# Patient Record
Sex: FEMALE | Race: WHITE
[De-identification: names, ages, dates, MRNs, and addresses within clinical notes are randomized per-mention and may not be internally consistent; named-entity substitution may affect disease eponyms.]

---

## 2020-06-17 ENCOUNTER — HOSPITAL ENCOUNTER (EMERGENCY)
Dept: HOSPITAL 12 - ER | Age: 70
Discharge: HOME | End: 2020-06-17
Payer: MEDICARE

## 2020-06-17 VITALS — WEIGHT: 160 LBS | HEIGHT: 64 IN | BODY MASS INDEX: 27.31 KG/M2

## 2020-06-17 DIAGNOSIS — R53.83: Primary | ICD-10-CM

## 2020-06-17 DIAGNOSIS — R53.1: ICD-10-CM

## 2020-06-17 DIAGNOSIS — Z79.84: ICD-10-CM

## 2020-06-17 DIAGNOSIS — E11.65: ICD-10-CM

## 2020-06-17 LAB
ALP SERPL-CCNC: 65 U/L (ref 50–136)
ALT SERPL W/O P-5'-P-CCNC: 23 U/L (ref 14–59)
APPEARANCE UR: CLEAR
AST SERPL-CCNC: 23 U/L (ref 15–37)
BASOPHILS # BLD AUTO: 0 K/UL (ref 0–8)
BASOPHILS NFR BLD AUTO: 0.5 % (ref 0–2)
BILIRUB DIRECT SERPL-MCNC: 0.1 MG/DL (ref 0–0.2)
BILIRUB SERPL-MCNC: 0.5 MG/DL (ref 0.2–1)
BILIRUB UR QL STRIP: NEGATIVE
BUN SERPL-MCNC: 15 MG/DL (ref 7–18)
CHLORIDE SERPL-SCNC: 104 MMOL/L (ref 98–107)
CO2 SERPL-SCNC: 29 MMOL/L (ref 21–32)
COLOR UR: YELLOW
CREAT SERPL-MCNC: 0.9 MG/DL (ref 0.6–1.3)
DEPRECATED SQUAMOUS URNS QL MICRO: (no result) /HPF
EOSINOPHIL # BLD AUTO: 0.1 K/UL (ref 0–0.7)
EOSINOPHIL NFR BLD AUTO: 1.9 % (ref 0–7)
GLUCOSE SERPL-MCNC: 144 MG/DL (ref 74–106)
GLUCOSE UR STRIP-MCNC: NEGATIVE MG/DL
HCT VFR BLD AUTO: 36.8 % (ref 31.2–41.9)
HGB BLD-MCNC: 12.3 G/DL (ref 10.9–14.3)
HGB UR QL STRIP: (no result)
KETONES UR STRIP-MCNC: NEGATIVE MG/DL
LEUKOCYTE ESTERASE UR QL STRIP: NEGATIVE
LYMPHOCYTES # BLD AUTO: 2.2 K/UL (ref 20–40)
LYMPHOCYTES NFR BLD AUTO: 35.8 % (ref 20.5–51.5)
MCH RBC QN AUTO: 28.1 UUG (ref 24.7–32.8)
MCHC RBC AUTO-ENTMCNC: 34 G/DL (ref 32.3–35.6)
MCV RBC AUTO: 83.8 FL (ref 75.5–95.3)
MONOCYTES # BLD AUTO: 0.5 K/UL (ref 2–10)
MONOCYTES NFR BLD AUTO: 8.3 % (ref 0–11)
MUCOUS THREADS URNS QL MICRO: (no result) /LPF
NEUTROPHILS # BLD AUTO: 3.4 K/UL (ref 1.8–8.9)
NEUTROPHILS NFR BLD AUTO: 53.5 % (ref 38.5–71.5)
NITRITE UR QL STRIP: NEGATIVE
PH UR STRIP: 5.5 [PH] (ref 5–8)
PLATELET # BLD AUTO: 257 K/UL (ref 179–408)
POTASSIUM SERPL-SCNC: 4.2 MMOL/L (ref 3.5–5.1)
RBC # BLD AUTO: 4.39 MIL/UL (ref 3.63–4.92)
RBC #/AREA URNS HPF: (no result) /HPF (ref 0–3)
SP GR UR STRIP: 1.01 (ref 1–1.03)
TSH SERPL DL<=0.005 MIU/L-ACNC: 2.34 MIU/ML (ref 0.36–3.74)
UROBILINOGEN UR STRIP-MCNC: 0.2 E.U./DL
WBC # BLD AUTO: 6.3 K/UL (ref 3.8–11.8)
WBC #/AREA URNS HPF: (no result) /HPF
WBC #/AREA URNS HPF: (no result) /HPF (ref 0–3)
WS STN SPEC: 7.3 G/DL (ref 6.4–8.2)

## 2020-06-17 PROCEDURE — A4663 DIALYSIS BLOOD PRESSURE CUFF: HCPCS

## 2020-06-17 NOTE — NUR
IV removed.  Catheter intact and site benign. Pressure and 4x4 gauze applied to 
site. No bleeding noted.  Patient discharged to home in stable condition with 
steady gait.  Written and verbal after care instructions given to patient and 
patient's . Patient and spouse verbalized understanding of instructions. 
Stressed follow up with PMD or return to ER for worsening s/s.

## 2021-06-12 ENCOUNTER — HOSPITAL ENCOUNTER (EMERGENCY)
Dept: HOSPITAL 12 - ER | Age: 71
Discharge: HOME | End: 2021-06-12
Payer: MEDICARE

## 2021-06-12 VITALS — SYSTOLIC BLOOD PRESSURE: 110 MMHG | DIASTOLIC BLOOD PRESSURE: 64 MMHG

## 2021-06-12 VITALS — BODY MASS INDEX: 27.31 KG/M2 | HEIGHT: 64 IN | WEIGHT: 160 LBS

## 2021-06-12 DIAGNOSIS — K76.0: ICD-10-CM

## 2021-06-12 DIAGNOSIS — J84.9: ICD-10-CM

## 2021-06-12 DIAGNOSIS — Z79.899: ICD-10-CM

## 2021-06-12 DIAGNOSIS — N12: Primary | ICD-10-CM

## 2021-06-12 DIAGNOSIS — Z79.84: ICD-10-CM

## 2021-06-12 DIAGNOSIS — E11.9: ICD-10-CM

## 2021-06-12 LAB
ALP SERPL-CCNC: 61 U/L (ref 50–136)
ALT SERPL W/O P-5'-P-CCNC: 17 U/L (ref 14–59)
APPEARANCE UR: (no result)
AST SERPL-CCNC: 15 U/L (ref 15–37)
BASOPHILS # BLD AUTO: 0 K/UL (ref 0–8)
BASOPHILS NFR BLD AUTO: 0.5 % (ref 0–2)
BILIRUB DIRECT SERPL-MCNC: 0.1 MG/DL (ref 0–0.2)
BILIRUB SERPL-MCNC: 0.5 MG/DL (ref 0.2–1)
BILIRUB UR QL STRIP: NEGATIVE
BUN SERPL-MCNC: 17 MG/DL (ref 7–18)
CHLORIDE SERPL-SCNC: 100 MMOL/L (ref 98–107)
CO2 SERPL-SCNC: 26 MMOL/L (ref 21–32)
COLOR UR: YELLOW
CREAT SERPL-MCNC: 0.8 MG/DL (ref 0.6–1.3)
DEPRECATED SQUAMOUS URNS QL MICRO: (no result) /HPF
EOSINOPHIL # BLD AUTO: 0.1 K/UL (ref 0–0.7)
EOSINOPHIL NFR BLD AUTO: 1.1 % (ref 0–7)
GLUCOSE SERPL-MCNC: 99 MG/DL (ref 74–106)
GLUCOSE UR STRIP-MCNC: NEGATIVE MG/DL
HCT VFR BLD AUTO: 37.3 % (ref 31.2–41.9)
HGB BLD-MCNC: 12.3 G/DL (ref 10.9–14.3)
HGB UR QL STRIP: (no result)
KETONES UR STRIP-MCNC: NEGATIVE MG/DL
LEUKOCYTE ESTERASE UR QL STRIP: (no result)
LIPASE SERPL-CCNC: 50 U/L (ref 73–393)
LYMPHOCYTES # BLD AUTO: 2.5 K/UL (ref 20–40)
LYMPHOCYTES NFR BLD AUTO: 28.3 % (ref 20.5–51.5)
MCH RBC QN AUTO: 27.5 UUG (ref 24.7–32.8)
MCHC RBC AUTO-ENTMCNC: 33 G/DL (ref 32.3–35.6)
MCV RBC AUTO: 83.7 FL (ref 75.5–95.3)
MONOCYTES # BLD AUTO: 0.5 K/UL (ref 2–10)
MONOCYTES NFR BLD AUTO: 5.4 % (ref 0–11)
MUCOUS THREADS URNS QL MICRO: (no result) /LPF
NEUTROPHILS # BLD AUTO: 5.6 K/UL (ref 1.8–8.9)
NEUTROPHILS NFR BLD AUTO: 64.7 % (ref 38.5–71.5)
NITRITE UR QL STRIP: POSITIVE
PH UR STRIP: 7 [PH] (ref 5–8)
PLATELET # BLD AUTO: 288 K/UL (ref 179–408)
POTASSIUM SERPL-SCNC: 3.9 MMOL/L (ref 3.5–5.1)
RBC # BLD AUTO: 4.46 MIL/UL (ref 3.63–4.92)
RBC #/AREA URNS HPF: (no result) /HPF (ref 0–3)
SP GR UR STRIP: 1.02 (ref 1–1.03)
UROBILINOGEN UR STRIP-MCNC: 0.2 E.U./DL
WBC # BLD AUTO: 8.7 K/UL (ref 3.8–11.8)
WBC #/AREA URNS HPF: (no result) /HPF
WS STN SPEC: 7.4 G/DL (ref 6.4–8.2)

## 2021-06-12 PROCEDURE — A4663 DIALYSIS BLOOD PRESSURE CUFF: HCPCS

## 2021-07-07 ENCOUNTER — HOSPITAL ENCOUNTER (EMERGENCY)
Dept: HOSPITAL 12 - ER | Age: 71
LOS: 1 days | Discharge: HOME | End: 2021-07-08
Payer: MEDICARE

## 2021-07-07 VITALS — WEIGHT: 150 LBS | HEIGHT: 66 IN | BODY MASS INDEX: 24.11 KG/M2

## 2021-07-07 DIAGNOSIS — Z79.84: ICD-10-CM
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
## 2021-07-07 DIAGNOSIS — N39.0: ICD-10-CM

## 2021-07-07 DIAGNOSIS — S09.90XA: Primary | ICD-10-CM
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
## 2021-07-07 DIAGNOSIS — E83.42: ICD-10-CM

## 2021-07-07 DIAGNOSIS — E11.9: ICD-10-CM

## 2021-07-07 DIAGNOSIS — Z79.82: ICD-10-CM

## 2021-07-07 DIAGNOSIS — I10: ICD-10-CM

## 2021-07-07 DIAGNOSIS — I49.3: ICD-10-CM

## 2021-07-07 LAB
HCT VFR BLD AUTO: 35.2 % (ref 31.2–41.9)
MCH RBC QN AUTO: 27.7 UUG (ref 24.7–32.8)
MCV RBC AUTO: 83.9 FL (ref 75.5–95.3)
PLATELET # BLD AUTO: 245 K/UL (ref 179–408)

## 2021-07-07 PROCEDURE — 81001 URINALYSIS AUTO W/SCOPE: CPT

## 2021-07-07 PROCEDURE — 85730 THROMBOPLASTIN TIME PARTIAL: CPT

## 2021-07-07 PROCEDURE — 93005 ELECTROCARDIOGRAM TRACING: CPT

## 2021-07-07 PROCEDURE — 70450 CT HEAD/BRAIN W/O DYE: CPT

## 2021-07-07 PROCEDURE — 96375 TX/PRO/DX INJ NEW DRUG ADDON: CPT

## 2021-07-07 PROCEDURE — 83735 ASSAY OF MAGNESIUM: CPT

## 2021-07-07 PROCEDURE — 87086 URINE CULTURE/COLONY COUNT: CPT

## 2021-07-07 PROCEDURE — 36415 COLL VENOUS BLD VENIPUNCTURE: CPT

## 2021-07-07 PROCEDURE — 99285 EMERGENCY DEPT VISIT HI MDM: CPT

## 2021-07-07 PROCEDURE — 85379 FIBRIN DEGRADATION QUANT: CPT

## 2021-07-07 PROCEDURE — 80048 BASIC METABOLIC PNL TOTAL CA: CPT

## 2021-07-07 PROCEDURE — 96365 THER/PROPH/DIAG IV INF INIT: CPT

## 2021-07-07 PROCEDURE — 84484 ASSAY OF TROPONIN QUANT: CPT

## 2021-07-07 PROCEDURE — 80076 HEPATIC FUNCTION PANEL: CPT

## 2021-07-07 PROCEDURE — 72125 CT NECK SPINE W/O DYE: CPT

## 2021-07-07 PROCEDURE — 85025 COMPLETE CBC W/AUTO DIFF WBC: CPT

## 2021-07-08 VITALS — SYSTOLIC BLOOD PRESSURE: 135 MMHG | DIASTOLIC BLOOD PRESSURE: 73 MMHG

## 2021-07-08 LAB
ALP SERPL-CCNC: 60 U/L (ref 50–136)
ALT SERPL W/O P-5'-P-CCNC: < 6 U/L (ref 14–59)
APPEARANCE UR: CLEAR
AST SERPL-CCNC: 17 U/L (ref 15–37)
BILIRUB DIRECT SERPL-MCNC: 0.1 MG/DL (ref 0–0.2)
BILIRUB SERPL-MCNC: 0.3 MG/DL (ref 0.2–1)
BILIRUB UR QL STRIP: NEGATIVE
BUN SERPL-MCNC: 22 MG/DL (ref 7–18)
CHLORIDE SERPL-SCNC: 102 MMOL/L (ref 98–107)
CO2 SERPL-SCNC: 26 MMOL/L (ref 21–32)
COLOR UR: YELLOW
CREAT SERPL-MCNC: 0.8 MG/DL (ref 0.6–1.3)
DEPRECATED SQUAMOUS URNS QL MICRO: (no result) /HPF
GLUCOSE SERPL-MCNC: 138 MG/DL (ref 74–106)
GLUCOSE UR STRIP-MCNC: NEGATIVE MG/DL
HGB UR QL STRIP: (no result)
KETONES UR STRIP-MCNC: NEGATIVE MG/DL
LEUKOCYTE ESTERASE UR QL STRIP: (no result)
NITRITE UR QL STRIP: NEGATIVE
PH UR STRIP: 6 [PH] (ref 5–8)
POTASSIUM SERPL-SCNC: 4.2 MMOL/L (ref 3.5–5.1)
RBC #/AREA URNS HPF: (no result) /HPF (ref 0–3)
SP GR UR STRIP: 1.01 (ref 1–1.03)
UROBILINOGEN UR STRIP-MCNC: 0.2 E.U./DL
WBC #/AREA URNS HPF: (no result) /HPF
WS STN SPEC: 7.2 G/DL (ref 6.4–8.2)

## 2021-07-08 RX ADMIN — MAGNESIUM SULFATE IN DEXTROSE SCH MLS/HR: 10 INJECTION, SOLUTION INTRAVENOUS at 00:15

## 2021-07-08 RX ADMIN — MAGNESIUM SULFATE IN DEXTROSE SCH MLS/HR: 10 INJECTION, SOLUTION INTRAVENOUS at 02:09

## 2023-05-17 ENCOUNTER — OFFICE (OUTPATIENT)
Dept: URBAN - METROPOLITAN AREA CLINIC 33 | Facility: CLINIC | Age: 73
End: 2023-05-17

## 2023-05-17 VITALS
TEMPERATURE: 97.7 F | SYSTOLIC BLOOD PRESSURE: 92 MMHG | DIASTOLIC BLOOD PRESSURE: 71 MMHG | WEIGHT: 158 LBS | HEIGHT: 66 IN

## 2023-05-17 DIAGNOSIS — R10.13 EPIGASTRIC PAIN: ICD-10-CM

## 2023-05-17 DIAGNOSIS — R63.4 WEIGHT LOSS: ICD-10-CM

## 2023-05-17 DIAGNOSIS — F32.A DEPRESSION: ICD-10-CM

## 2023-05-17 DIAGNOSIS — E13.9 NIDDY (NON-INSULIN DEPENDENT DIABETES MELLITUS IN YOUNG): ICD-10-CM

## 2023-05-17 DIAGNOSIS — K59.01 CONSTIPATION, SLOW TRANSIT: ICD-10-CM

## 2023-05-17 DIAGNOSIS — K21.9 GERD: ICD-10-CM

## 2023-05-17 DIAGNOSIS — F41.9 ANXIETY DISORDER: ICD-10-CM

## 2023-05-17 PROCEDURE — 99204 OFFICE O/P NEW MOD 45 MIN: CPT | Performed by: SPECIALIST

## 2023-05-17 NOTE — SERVICEHPINOTES
The patient complains of abdominal pain.    Symptoms began   several  years   ago with onset that was    abrupt  .    It is localized as   upper abdomen and lower abdomen   pain.    It is described as   moderate   in nature.   Pain quality is described as   burning  .    It also radiates to the   back  .    Discomfort typically lasts   several  hours   and has a course which is   intermittent  .   It usually starts   abruptly  .    Symptom triggers include   nothing specific  .  Alleviating factors include   nothing specific  .    Symptoms have been   non-progressive/stable   since onset.    Alarm features noted:   none  .   The patient also reports   abdominal bloating/distension  .      Symptoms have caused the patient to    Similar symptoms    occurred in the past, associated with    .   Noteworthy prior abdominal interventions include   .   has been performed previously to evaluate the patient's symptoms.   Remedies tried to date include     with    .    Other pertinent testing to date includes,     
nabila dahlThe patient is seen for the evaluation of suspected GERD.    Noted the onset of   heartburn  several  years   ago  .   Symptoms have been occurring   1 - 3   time(s) per   week  .    During a given day, they are most prevalent   shortly after eating meals  .    They are exacerbated by   eating meals late at night  .   In the past, the patient has tried   .   Currently takes   Omeprazole   dosed   every day  .   On this therapy, symptom response has been   intermittent  .    Associated symptoms include   nausea  .      Has also noted atypical (non-esophageal) symptoms including   .    A prior EGD has been performed and showed   . 
nabila dahl   Reports EGD/COLONOSCOPY 5 years ago, but we have no records.
nabila dahl Reports stress and feeling depressed.  
nabila dahlTo see Cadiologist next week for possible cardiac stress test. span id="{[id number]}" class="narrative freetextNormal" type="freetext" canedit="true" suppressed="false" nid="[id number]" gid="{[id number]}" bound="false"/span